# Patient Record
(demographics unavailable — no encounter records)

---

## 2025-03-12 NOTE — ASSESSMENT
[FreeTextEntry1] : #LBP H/o sciatica and notes a more severe muscle strain in the past, with recent mild strain that is taking a while to recover. No red flag symptoms. We reviewed symptomatic treatment, activity as tolerated with an emphasis on core strengthening, pt very open to PT referral.  #Pelvic Floor Dysfunction Pt shares h/o UTI as a child that left her with some leakage, has concerns about planning for pregnancy with existing pelvic floor dysfunction and would like to evaluated prior to pregnancy to help optimize her pelvic floor health in preparation for planned pregnancy.   #HM Thinks UTD on tdap Unsure if due for pap, no h/o abn, usually follows with Gyn Completed gardasil Does not need STI screening today Okay with bloodwork today Condoms for contraception currently, planning to try to get pregnant this summer, we discussed the importance of prenatal vitamin, pt also would like Ob referral

## 2025-03-12 NOTE — HISTORY OF PRESENT ILLNESS
[de-identified] : DEVEN FINCH is a 34 year y/o F with PMH of OCD who presents as a new patient today to establish care. CC LBP, gradual weight gain.  #LBP Has been going for a few weeks, feels like it is slow to heal Has been laying off of exercise, not sure if there are other things she should be doing Thinks she lifted heavier than she should have 1 year ago had an actual muscle strain but this episode felt less severe Pain started 4 days later  Some radiation down L leg No weakness, tingling, numbness No loss of bowel or bladder control Has taken some NSAIDs, alternated between ice and heat Ice has been helpful.   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  PMH: LBP, weight gain, OCD has taken bupropion and lexapro in the past not currently on any. Ovarian torsion 10 years ago. Sciatica PSH: ovarian torsion surgery emergently 10 years ago, salvaged ovary Allergies: NKDA Medications: none currently, MVI Fam Hx: see chart Social History: Lives with  Works as writer, copyrighting, culture writing for [employer] Tobacco use: never Alcohol use: very rarely, once every 6 months, used to not drink at all, now will have 1 glass of wine Drug use: occasional weed smoking, usually edibles Sexually active: Y, doesn't need testing Diet & Exercise: gym 2-3 times per week, tries to get in as many steps as possible Mood: notes increased anxiety recently attributes to the state of the world, has a therapist. No SI/HI Firearms: N  #Health Maintenance Flu shot: due Covid vaccine: maybe UTD? Tdap: thinks UTD Gardasil: completed Pap: last saw Gyn 1 year ago, not sure if due or not, no h/o abn STI screen: not today Family Planning: regular, hoping to start trying to get pregnant this summer. [id number]. Using condoms currently.

## 2025-03-12 NOTE — PLAN
[FreeTextEntry1] : #LBP - PT referral - Supportive care  #Pelvic Floor Dysfunction - Pelvic floor PT  #HM - Bloodwork today - Vaccine appt for tdap if due - OB referral

## 2025-03-12 NOTE — HEALTH RISK ASSESSMENT
[Yes] : Yes [Monthly or less (1 pt)] : Monthly or less (1 point) [1 or 2 (0 pts)] : 1 or 2 (0 points) [Never (0 pts)] : Never (0 points) [0] : 2) Feeling down, depressed, or hopeless: Not at all (0) [PHQ-2 Negative - No further assessment needed] : PHQ-2 Negative - No further assessment needed [Patient reported PAP Smear was normal] : Patient reported PAP Smear was normal [HIV test declined] : HIV test declined [Never] : Never [NO] : No